# Patient Record
Sex: FEMALE | Race: WHITE | URBAN - METROPOLITAN AREA
[De-identification: names, ages, dates, MRNs, and addresses within clinical notes are randomized per-mention and may not be internally consistent; named-entity substitution may affect disease eponyms.]

---

## 2022-08-15 ENCOUNTER — OFFICE VISIT (OUTPATIENT)
Dept: URBAN - METROPOLITAN AREA CLINIC 128 | Facility: CLINIC | Age: 37
End: 2022-08-15
Payer: COMMERCIAL

## 2022-08-15 ENCOUNTER — WEB ENCOUNTER (OUTPATIENT)
Dept: URBAN - METROPOLITAN AREA CLINIC 128 | Facility: CLINIC | Age: 37
End: 2022-08-15

## 2022-08-15 VITALS
TEMPERATURE: 97.3 F | WEIGHT: 156 LBS | BODY MASS INDEX: 27.64 KG/M2 | DIASTOLIC BLOOD PRESSURE: 78 MMHG | HEIGHT: 63 IN | HEART RATE: 78 BPM | SYSTOLIC BLOOD PRESSURE: 116 MMHG

## 2022-08-15 DIAGNOSIS — R19.7 DIARRHEA, UNSPECIFIED TYPE: ICD-10-CM

## 2022-08-15 DIAGNOSIS — K62.5 RECTAL BLEEDING: ICD-10-CM

## 2022-08-15 PROCEDURE — 99204 OFFICE O/P NEW MOD 45 MIN: CPT | Performed by: PHYSICIAN ASSISTANT

## 2022-08-15 RX ORDER — CHOLESTYRAMINE 4 G/9G
1 PACKET MIXED WITH WATER OR NON-CARBONATED DRINK POWDER, FOR SUSPENSION ORAL TWICE A DAY
Qty: 60 | Refills: 2 | OUTPATIENT
Start: 2022-08-15

## 2022-08-15 NOTE — HPI-OTHER HISTORIES
The patient is a new patient who presents today due to the following symptoms: bright red blood in stool She reports having mucous in the stool and watery stools 2-5 BMs a day Patient denies melena Patient denies any recent antibiotics, travel outside of the [place], sick contacts Duration of symptoms: 05/2022 She denies any blood thinner use Associated symptoms: none What alleviates symptoms: none What aggravates symptoms: none There is no family history of colon polyps or colon cancer.  Last colonoscopy: never The patient denies any family history of colon polyps or colon cancer or IBD

## 2022-08-15 NOTE — PHYSICAL EXAM GASTROINTESTINAL
Abdomen , soft, nontender, nondistended , no guarding or rigidity , no masses palpable , normal bowel sounds , Liver and Spleen , no hepatomegaly present Rectal  , normal sphincter tone, no external or internal hemorrhoids, no rectal masses, no bleeding present. Rectal examination was performed with a chaperone present

## 2022-08-16 ENCOUNTER — LAB OUTSIDE AN ENCOUNTER (OUTPATIENT)
Dept: URBAN - METROPOLITAN AREA CLINIC 128 | Facility: CLINIC | Age: 37
End: 2022-08-16

## 2022-08-16 LAB
A/G RATIO: 1.7
ABSOLUTE BASOPHILS: 19
ABSOLUTE EOSINOPHILS: 19
ABSOLUTE LYMPHOCYTES: 1099
ABSOLUTE MONOCYTES: 379
ABSOLUTE NEUTROPHILS: 3283
ALBUMIN: 4.4
ALKALINE PHOSPHATASE: 54
ALT (SGPT): 12
AST (SGOT): 16
BASOPHILS: 0.4
BILIRUBIN, TOTAL: 0.4
BUN/CREATININE RATIO: (no result)
BUN: 22
CALCIUM: 9.7
CARBON DIOXIDE, TOTAL: 24
CHLORIDE: 103
CREATININE: 0.97
EGFR: 78
EOSINOPHILS: 0.4
FERRITIN, SERUM: 17
GLOBULIN, TOTAL: 2.6
GLUCOSE: 89
HEMATOCRIT: 35.8
HEMOGLOBIN: 12.4
IMMUNOGLOBULIN A: 278
INTERPRETATION: (no result)
IRON BIND.CAP.(TIBC): 392
IRON SATURATION: 13
IRON: 51
LYMPHOCYTES: 22.9
MCH: 28.4
MCHC: 34.6
MCV: 82.1
MONOCYTES: 7.9
MPV: 10.1
NEUTROPHILS: 68.4
PLATELET COUNT: 253
POTASSIUM: 4
PROTEIN, TOTAL: 7
RDW: 12.3
RED BLOOD CELL COUNT: 4.36
SODIUM: 137
TISSUE TRANSGLUTAMINASE AB, IGA: <1
TSH: 2
WHITE BLOOD CELL COUNT: 4.8

## 2022-08-18 ENCOUNTER — CLAIMS CREATED FROM THE CLAIM WINDOW (OUTPATIENT)
Dept: URBAN - METROPOLITAN AREA CLINIC 4 | Facility: CLINIC | Age: 37
End: 2022-08-18
Payer: COMMERCIAL

## 2022-08-18 ENCOUNTER — OFFICE VISIT (OUTPATIENT)
Dept: URBAN - METROPOLITAN AREA SURGERY CENTER 31 | Facility: SURGERY CENTER | Age: 37
End: 2022-08-18
Payer: COMMERCIAL

## 2022-08-18 DIAGNOSIS — K63.89 OTHER SPECIFIED DISEASES OF INTESTINE: ICD-10-CM

## 2022-08-18 DIAGNOSIS — K51.919 ULCERATIVE COLITIS, UNSPECIFIED WITH UNSPECIFIED COMPLICATIONS: ICD-10-CM

## 2022-08-18 DIAGNOSIS — K51.30 CHRONIC ULCERATIVE PROCTOSIGMOIDITIS, WITHOUT COMPLICATIONS: ICD-10-CM

## 2022-08-18 PROCEDURE — 45380 COLONOSCOPY AND BIOPSY: CPT | Performed by: INTERNAL MEDICINE

## 2022-08-18 PROCEDURE — 88305 TISSUE EXAM BY PATHOLOGIST: CPT | Performed by: PATHOLOGY

## 2022-08-18 PROCEDURE — G8907 PT DOC NO EVENTS ON DISCHARG: HCPCS | Performed by: INTERNAL MEDICINE

## 2022-08-19 ENCOUNTER — WEB ENCOUNTER (OUTPATIENT)
Dept: URBAN - METROPOLITAN AREA CLINIC 128 | Facility: CLINIC | Age: 37
End: 2022-08-19

## 2022-08-22 ENCOUNTER — WEB ENCOUNTER (OUTPATIENT)
Dept: URBAN - METROPOLITAN AREA CLINIC 128 | Facility: CLINIC | Age: 37
End: 2022-08-22

## 2022-08-22 ENCOUNTER — TELEPHONE ENCOUNTER (OUTPATIENT)
Dept: URBAN - METROPOLITAN AREA CLINIC 92 | Facility: CLINIC | Age: 37
End: 2022-08-22

## 2022-08-22 RX ORDER — BUDESONIDE 9 MG/1
1 TABLET IN THE MORNING TABLET, EXTENDED RELEASE ORAL ONCE A DAY
Qty: 30 | OUTPATIENT
Start: 2022-08-22 | End: 2022-09-21

## 2022-08-22 RX ORDER — CHOLESTYRAMINE 4 G/9G
1 PACKET MIXED WITH WATER OR NON-CARBONATED DRINK POWDER, FOR SUSPENSION ORAL TWICE A DAY
Qty: 60 | Refills: 2 | Status: ACTIVE | COMMUNITY
Start: 2022-08-15

## 2022-08-22 RX ORDER — BUDESONIDE 9 MG/1
1 TABLET IN THE MORNING TABLET, EXTENDED RELEASE ORAL ONCE A DAY
Qty: 30 | Refills: 0 | OUTPATIENT
Start: 2022-08-30 | End: 2022-09-29

## 2022-08-25 ENCOUNTER — TELEPHONE ENCOUNTER (OUTPATIENT)
Dept: URBAN - METROPOLITAN AREA CLINIC 92 | Facility: CLINIC | Age: 37
End: 2022-08-25

## 2022-08-25 RX ORDER — BUDESONIDE 9 MG/1
1 TABLET IN THE MORNING TABLET, EXTENDED RELEASE ORAL ONCE A DAY
Qty: 30
Start: 2022-08-22 | End: 2022-09-24

## 2022-08-30 ENCOUNTER — TELEPHONE ENCOUNTER (OUTPATIENT)
Dept: URBAN - METROPOLITAN AREA CLINIC 19 | Facility: CLINIC | Age: 37
End: 2022-08-30

## 2022-08-30 RX ORDER — PREDNISONE 20 MG/1
2 TABLETS TABLET ORAL ONCE A DAY
Qty: 28 | Refills: 1 | OUTPATIENT
Start: 2022-09-02 | End: 2022-09-30

## 2022-09-04 LAB
CALPROTECTIN, FECAL: 1560
CAMPYLOBACTER SPP. AG,EIA: (no result)
CLOSTRIDIUM DIFFICILE: (no result)
EIA (1): (no result)
FECAL FAT, QUALITATIVE: (no result)
LEUKOCYTES: (no result)
OVA AND PARASITES, CONC AND PERM SMEAR: (no result)
PANCREATIC ELASTASE, FECAL: 252
SALMONELLA AND SHIGELLA, CULTURE: (no result)
SHIGA TOXINS, EIA W/RFL TO E.COLI O157 CULTURE: (no result)

## 2022-10-05 ENCOUNTER — OFFICE VISIT (OUTPATIENT)
Dept: URBAN - METROPOLITAN AREA CLINIC 128 | Facility: CLINIC | Age: 37
End: 2022-10-05
Payer: COMMERCIAL

## 2022-10-05 VITALS
SYSTOLIC BLOOD PRESSURE: 124 MMHG | DIASTOLIC BLOOD PRESSURE: 76 MMHG | HEART RATE: 78 BPM | TEMPERATURE: 97.9 F | BODY MASS INDEX: 27.46 KG/M2 | HEIGHT: 63 IN | WEIGHT: 155 LBS

## 2022-10-05 DIAGNOSIS — R19.7 DIARRHEA, UNSPECIFIED TYPE: ICD-10-CM

## 2022-10-05 DIAGNOSIS — K51.30 ULCERATIVE RECTOSIGMOIDITIS WITHOUT COMPLICATION: ICD-10-CM

## 2022-10-05 DIAGNOSIS — K62.5 RECTAL BLEEDING: ICD-10-CM

## 2022-10-05 PROCEDURE — 99214 OFFICE O/P EST MOD 30 MIN: CPT | Performed by: PHYSICIAN ASSISTANT

## 2022-10-05 RX ORDER — CHOLESTYRAMINE 4 G/9G
1 PACKET MIXED WITH WATER OR NON-CARBONATED DRINK POWDER, FOR SUSPENSION ORAL TWICE A DAY
Qty: 60 | Refills: 2 | Status: ACTIVE | COMMUNITY
Start: 2022-08-15

## 2022-10-05 RX ORDER — MESALAMINE 1.2 G/1
4 TABLETS WITH A MEAL TABLET, DELAYED RELEASE ORAL ONCE A DAY
Qty: 120 TABLET | Refills: 2 | OUTPATIENT
Start: 2022-10-05 | End: 2023-01-02

## 2022-10-05 RX ORDER — BUDESONIDE 9 MG/1
1 TABLET IN THE MORNING TABLET, EXTENDED RELEASE ORAL ONCE A DAY
Status: ACTIVE | COMMUNITY

## 2022-10-05 NOTE — HPI-OTHER HISTORIES
The patient is here for a follow up visit on her diarrhea and bright red blood per rectum symptoms.  Her 8/22 colonoscopy revealed diffuse chronic and acute colitis consistent with ulcerative colitis with no malignancy or dysplasia noted and a 1 year repeat colonoscopy was recommended. 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 Since starting this treatment she went from 10 BMs a day down to 3 BMs a day but still have rectal bleeding. She denies abdominal pain. Her recent calprotectin level was 1560. Patient denies melena Patient denies any recent antibiotics, travel outside of the [place], sick contacts Duration of symptoms: 05/2022 She denies any blood thinner use Associated symptoms: none What alleviates symptoms: none What aggravates symptoms: none There is no family history of colon polyps or colon cancer.  The patient denies any family history of colon polyps or colon cancer or IBD

## 2022-11-07 ENCOUNTER — TELEPHONE ENCOUNTER (OUTPATIENT)
Dept: URBAN - METROPOLITAN AREA CLINIC 128 | Facility: CLINIC | Age: 37
End: 2022-11-07

## 2022-11-07 RX ORDER — BUDESONIDE 9 MG/1
1 TABLET IN THE MORNING TABLET, EXTENDED RELEASE ORAL ONCE A DAY
Qty: 30 | Refills: 1

## 2022-11-07 RX ORDER — PREDNISONE 10 MG/1
TAKE 4 TABS PO X 1 WEEK, TAKE 3 TABS PO X 1 WEEK, TAKE 2 TABS PO X 1 WEEK, TAKE 1 TAB PO X 1 WEEK TABLET ORAL ONCE A DAY
Qty: 70 | Refills: 0 | OUTPATIENT
Start: 2022-11-09 | End: 2022-12-09

## 2022-11-30 ENCOUNTER — OFFICE VISIT (OUTPATIENT)
Dept: URBAN - METROPOLITAN AREA CLINIC 128 | Facility: CLINIC | Age: 37
End: 2022-11-30
Payer: COMMERCIAL

## 2022-11-30 VITALS
BODY MASS INDEX: 28 KG/M2 | SYSTOLIC BLOOD PRESSURE: 117 MMHG | TEMPERATURE: 97.9 F | HEIGHT: 63 IN | HEART RATE: 76 BPM | DIASTOLIC BLOOD PRESSURE: 71 MMHG | WEIGHT: 158 LBS

## 2022-11-30 DIAGNOSIS — R19.7 DIARRHEA, UNSPECIFIED TYPE: ICD-10-CM

## 2022-11-30 DIAGNOSIS — K51.30 ULCERATIVE RECTOSIGMOIDITIS WITHOUT COMPLICATION: ICD-10-CM

## 2022-11-30 DIAGNOSIS — K62.5 RECTAL BLEEDING: ICD-10-CM

## 2022-11-30 PROCEDURE — 99214 OFFICE O/P EST MOD 30 MIN: CPT | Performed by: PHYSICIAN ASSISTANT

## 2022-11-30 RX ORDER — MESALAMINE 1.2 G/1
4 TABLETS WITH A MEAL TABLET, DELAYED RELEASE ORAL ONCE A DAY
Qty: 120 TABLET | Refills: 2 | Status: ACTIVE | COMMUNITY
Start: 2022-10-05 | End: 2023-01-02

## 2022-11-30 RX ORDER — CHOLESTYRAMINE 4 G/9G
1 PACKET MIXED WITH WATER OR NON-CARBONATED DRINK POWDER, FOR SUSPENSION ORAL TWICE A DAY
Qty: 60 | Refills: 2 | Status: ON HOLD | COMMUNITY
Start: 2022-08-15

## 2022-11-30 RX ORDER — BUDESONIDE 9 MG/1
1 TABLET IN THE MORNING TABLET, EXTENDED RELEASE ORAL ONCE A DAY
Qty: 30 | Refills: 1 | Status: ACTIVE | COMMUNITY

## 2022-11-30 RX ORDER — PREDNISONE 10 MG/1
TAKE 4 TABS PO X 1 WEEK, TAKE 3 TABS PO X 1 WEEK, TAKE 2 TABS PO X 1 WEEK, TAKE 1 TAB PO X 1 WEEK TABLET ORAL ONCE A DAY
Qty: 70 | Refills: 0 | Status: ON HOLD | COMMUNITY
Start: 2022-11-09 | End: 2022-12-09

## 2022-11-30 RX ORDER — MESALAMINE 1.2 G/1
4 TABLETS WITH A MEAL TABLET, DELAYED RELEASE ORAL ONCE A DAY
Qty: 120 TABLET | Refills: 2 | OUTPATIENT

## 2022-11-30 RX ORDER — BUDESONIDE 9 MG/1
1 TABLET IN THE MORNING TABLET, EXTENDED RELEASE ORAL ONCE A DAY
Qty: 30 | Refills: 1 | OUTPATIENT
Start: 2022-11-30 | End: 2023-01-29

## 2022-11-30 NOTE — HPI-OTHER HISTORIES
The patient is here for a follow up visit on her diarrhea and bright red blood per rectum symptoms. She has improved. She is now having 2 BMs a day. She is on lialda and is finishing the uceris currently. She has no blood or mucus in stool. Her 8/22 colonoscopy revealed diffuse chronic and acute colitis consistent with ulcerative colitis with no malignancy or dysplasia noted and a 1 year repeat colonoscopy was recommended. 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 Since starting this treatment she went from 10 BMs a day down to 2 BMs a day but still have rectal bleeding. She denies abdominal pain. Her recent calprotectin level was 1560. Patient denies melena Patient denies any recent antibiotics, travel outside of the [place], sick contacts Duration of symptoms: 05/2022 She denies any blood thinner use Associated symptoms: none What alleviates symptoms: none What aggravates symptoms: none There is no family history of colon polyps or colon cancer.  The patient denies any family history of colon polyps or colon cancer or IBD

## 2023-01-11 ENCOUNTER — OFFICE VISIT (OUTPATIENT)
Dept: URBAN - METROPOLITAN AREA CLINIC 128 | Facility: CLINIC | Age: 38
End: 2023-01-11
Payer: COMMERCIAL

## 2023-01-11 ENCOUNTER — WEB ENCOUNTER (OUTPATIENT)
Dept: URBAN - METROPOLITAN AREA CLINIC 128 | Facility: CLINIC | Age: 38
End: 2023-01-11

## 2023-01-11 VITALS
HEART RATE: 74 BPM | BODY MASS INDEX: 28.17 KG/M2 | DIASTOLIC BLOOD PRESSURE: 81 MMHG | HEIGHT: 63 IN | TEMPERATURE: 98.2 F | WEIGHT: 159 LBS | SYSTOLIC BLOOD PRESSURE: 119 MMHG

## 2023-01-11 DIAGNOSIS — K62.5 RECTAL BLEEDING: ICD-10-CM

## 2023-01-11 DIAGNOSIS — R19.7 DIARRHEA, UNSPECIFIED TYPE: ICD-10-CM

## 2023-01-11 DIAGNOSIS — K51.30 ULCERATIVE RECTOSIGMOIDITIS WITHOUT COMPLICATION: ICD-10-CM

## 2023-01-11 PROCEDURE — 99214 OFFICE O/P EST MOD 30 MIN: CPT | Performed by: PHYSICIAN ASSISTANT

## 2023-01-11 RX ORDER — VEDOLIZUMAB 300 MG/5ML
AS DIRECTED INJECTION, POWDER, LYOPHILIZED, FOR SOLUTION INTRAVENOUS
Qty: 300 MILLIGRAMS | Refills: 0 | OUTPATIENT
Start: 2023-01-11 | End: 2023-04-11

## 2023-01-11 RX ORDER — CHOLESTYRAMINE 4 G/9G
1 PACKET MIXED WITH WATER OR NON-CARBONATED DRINK POWDER, FOR SUSPENSION ORAL TWICE A DAY
Qty: 60 | Refills: 2 | Status: ON HOLD | COMMUNITY
Start: 2022-08-15

## 2023-01-11 RX ORDER — BUDESONIDE 9 MG/1
1 TABLET IN THE MORNING TABLET, EXTENDED RELEASE ORAL ONCE A DAY
Qty: 30 | Refills: 1 | Status: ON HOLD | COMMUNITY

## 2023-01-11 RX ORDER — MESALAMINE 1.2 G/1
4 TABLETS WITH A MEAL TABLET, DELAYED RELEASE ORAL ONCE A DAY
Qty: 120 TABLET | Refills: 2 | Status: ACTIVE | COMMUNITY

## 2023-01-11 NOTE — HPI-OTHER HISTORIES
The patient is here for a follow up visit on her diarrhea and bright red blood per rectum symptoms. She had improved on lialda and uceris but when she stops the uceris her symptoms come back. She now back to having 10 BMs a day with bright red blood at times. She denies abdominal pain. Her 8/22 colonoscopy revealed diffuse chronic and acute colitis consistent with ulcerative colitis with no malignancy or dysplasia noted and a 1 year repeat colonoscopy was recommended. 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 Since starting this treatment she went from 10 BMs a day down to 2 BMs a day but still have rectal bleeding. Her 08/2022 calprotectin level was 1560. Patient denies melena Patient denies any recent antibiotics, travel outside of the [place], sick contacts Duration of symptoms: 05/2022 She denies any blood thinner use Associated symptoms: none What alleviates symptoms: none What aggravates symptoms: none There is no family history of colon polyps or colon cancer.  The patient denies any family history of colon polyps or colon cancer or IBD

## 2023-01-12 ENCOUNTER — TELEPHONE ENCOUNTER (OUTPATIENT)
Dept: URBAN - METROPOLITAN AREA CLINIC 18 | Facility: CLINIC | Age: 38
End: 2023-01-12

## 2023-01-12 ENCOUNTER — LAB OUTSIDE AN ENCOUNTER (OUTPATIENT)
Dept: URBAN - METROPOLITAN AREA CLINIC 128 | Facility: CLINIC | Age: 38
End: 2023-01-12

## 2023-01-17 PROBLEM — 41364008: Status: ACTIVE | Noted: 2022-10-05

## 2023-01-18 LAB
A/G RATIO: 1.7
ABSOLUTE BASOPHILS: 22
ABSOLUTE EOSINOPHILS: 22
ABSOLUTE LYMPHOCYTES: 1150
ABSOLUTE MONOCYTES: 421
ABSOLUTE NEUTROPHILS: 3785
ALBUMIN: 4.7
ALKALINE PHOSPHATASE: 65
ALT (SGPT): 13
ANCA SCREEN: (no result)
AST (SGOT): 16
BASOPHILS: 0.4
BILIRUBIN, TOTAL: 0.3
BUN/CREATININE RATIO: (no result)
BUN: 14
CALCIUM: 10
CARBON DIOXIDE, TOTAL: 25
CHLORIDE: 103
CREATININE: 0.89
EGFR: 86
EOSINOPHILS: 0.4
GLOBULIN, TOTAL: 2.7
GLUCOSE: 89
HBSAG SCREEN: (no result)
HEMATOCRIT: 35
HEMOGLOBIN: 11.4
HEP A AB, IGM: (no result)
HEP B CORE AB, IGM: (no result)
HEP C VIRUS AB: 0.04
HEPATITIS C ANTIBODY: (no result)
LYMPHOCYTES: 21.3
MCH: 24.7
MCHC: 32.6
MCV: 75.9
MITOGEN-NIL: >10
MONOCYTES: 7.8
MPV: 10.5
MYELOPEROXIDASE ANTIBODY: <1
NEUTROPHILS: 70.1
P-ANCA TITER: (no result)
PLATELET COUNT: 316
POTASSIUM: 4.3
PROTEIN, TOTAL: 7.4
PROTEINASE-3 ANTIBODY: <1
QUANTIFERON NIL VALUE: 0.07
QUANTIFERON TB1 AG VALUE: <0
QUANTIFERON TB2 AG VALUE: 0
QUANTIFERON-TB GOLD PLUS: NEGATIVE
RDW: 12.4
RED BLOOD CELL COUNT: 4.61
SED RATE BY MODIFIED: 9
SODIUM: 140
WHITE BLOOD CELL COUNT: 5.4

## 2023-01-19 LAB
ANCA SCREEN: (no result)
ATYPICAL P ANCA TITER: (no result)
MYELOPEROXIDASE ANTIBODY: <1
PROTEINASE-3 ANTIBODY: <1
SACCHAROMYCES CEREVISIAE AB (ASCA) (IGA): 7.3
SACCHAROMYCES CEREVISIAE AB (ASCA) (IGG): 13.7

## 2023-02-06 ENCOUNTER — TELEPHONE ENCOUNTER (OUTPATIENT)
Dept: URBAN - METROPOLITAN AREA CLINIC 18 | Facility: CLINIC | Age: 38
End: 2023-02-06

## 2023-02-23 ENCOUNTER — OFFICE VISIT (OUTPATIENT)
Dept: URBAN - METROPOLITAN AREA CLINIC 79 | Facility: CLINIC | Age: 38
End: 2023-02-23

## 2023-02-23 ENCOUNTER — CLAIMS CREATED FROM THE CLAIM WINDOW (OUTPATIENT)
Dept: URBAN - METROPOLITAN AREA CLINIC 79 | Facility: CLINIC | Age: 38
End: 2023-02-23
Payer: COMMERCIAL

## 2023-02-23 ENCOUNTER — CLAIMS CREATED FROM THE CLAIM WINDOW (OUTPATIENT)
Dept: URBAN - METROPOLITAN AREA CLINIC 79 | Facility: CLINIC | Age: 38
End: 2023-02-23

## 2023-02-23 VITALS
DIASTOLIC BLOOD PRESSURE: 80 MMHG | RESPIRATION RATE: 18 BRPM | TEMPERATURE: 97.7 F | SYSTOLIC BLOOD PRESSURE: 127 MMHG | HEART RATE: 83 BPM | WEIGHT: 162 LBS | BODY MASS INDEX: 28.7 KG/M2 | HEIGHT: 63 IN

## 2023-02-23 DIAGNOSIS — K51.80 OTHER ULCERATIVE COLITIS: ICD-10-CM

## 2023-02-23 PROCEDURE — 96413 CHEMO IV INFUSION 1 HR: CPT | Performed by: INTERNAL MEDICINE

## 2023-02-23 RX ORDER — MESALAMINE 1.2 G/1
4 TABLETS WITH A MEAL TABLET, DELAYED RELEASE ORAL ONCE A DAY
Qty: 120 TABLET | Refills: 2 | Status: ACTIVE | COMMUNITY

## 2023-02-23 RX ORDER — BUDESONIDE 9 MG/1
1 TABLET IN THE MORNING TABLET, EXTENDED RELEASE ORAL ONCE A DAY
Qty: 30 | Refills: 1 | Status: ON HOLD | COMMUNITY

## 2023-02-23 RX ORDER — CHOLESTYRAMINE 4 G/9G
1 PACKET MIXED WITH WATER OR NON-CARBONATED DRINK POWDER, FOR SUSPENSION ORAL TWICE A DAY
Qty: 60 | Refills: 2 | Status: ON HOLD | COMMUNITY
Start: 2022-08-15

## 2023-02-23 RX ORDER — VEDOLIZUMAB 300 MG/5ML
AS DIRECTED INJECTION, POWDER, LYOPHILIZED, FOR SOLUTION INTRAVENOUS
Qty: 300 MILLIGRAMS | Refills: 0 | Status: ACTIVE | COMMUNITY
Start: 2023-01-11 | End: 2023-04-11

## 2023-02-24 ENCOUNTER — TELEPHONE ENCOUNTER (OUTPATIENT)
Dept: URBAN - METROPOLITAN AREA CLINIC 128 | Facility: CLINIC | Age: 38
End: 2023-02-24

## 2023-03-03 ENCOUNTER — TELEPHONE ENCOUNTER (OUTPATIENT)
Dept: URBAN - METROPOLITAN AREA CLINIC 128 | Facility: CLINIC | Age: 38
End: 2023-03-03

## 2023-03-06 PROBLEM — 64766004 ULCERATIVE COLITIS: Status: ACTIVE | Noted: 2023-03-06

## 2023-03-09 ENCOUNTER — OFFICE VISIT (OUTPATIENT)
Dept: URBAN - METROPOLITAN AREA CLINIC 79 | Facility: CLINIC | Age: 38
End: 2023-03-09
Payer: COMMERCIAL

## 2023-03-09 VITALS
SYSTOLIC BLOOD PRESSURE: 138 MMHG | BODY MASS INDEX: 28.7 KG/M2 | WEIGHT: 162 LBS | HEART RATE: 76 BPM | HEIGHT: 63 IN | TEMPERATURE: 97.7 F | RESPIRATION RATE: 18 BRPM | DIASTOLIC BLOOD PRESSURE: 76 MMHG

## 2023-03-09 DIAGNOSIS — K51.80 CHRONIC PANCOLONIC ULCERATIVE COLITIS: ICD-10-CM

## 2023-03-09 PROCEDURE — 96413 CHEMO IV INFUSION 1 HR: CPT | Performed by: INTERNAL MEDICINE

## 2023-03-09 RX ORDER — MESALAMINE 1.2 G/1
4 TABLETS WITH A MEAL TABLET, DELAYED RELEASE ORAL ONCE A DAY
Qty: 120 TABLET | Refills: 2 | Status: ACTIVE | COMMUNITY

## 2023-03-09 RX ORDER — BUDESONIDE 9 MG/1
1 TABLET IN THE MORNING TABLET, EXTENDED RELEASE ORAL ONCE A DAY
Qty: 30 | Refills: 1 | Status: ON HOLD | COMMUNITY

## 2023-03-09 RX ORDER — CHOLESTYRAMINE 4 G/9G
1 PACKET MIXED WITH WATER OR NON-CARBONATED DRINK POWDER, FOR SUSPENSION ORAL TWICE A DAY
Qty: 60 | Refills: 2 | Status: ON HOLD | COMMUNITY
Start: 2022-08-15

## 2023-03-09 RX ORDER — VEDOLIZUMAB 300 MG/5ML
AS DIRECTED INJECTION, POWDER, LYOPHILIZED, FOR SOLUTION INTRAVENOUS
Qty: 300 MILLIGRAMS | Refills: 0 | Status: ACTIVE | COMMUNITY
Start: 2023-01-11 | End: 2023-04-11

## 2023-03-15 ENCOUNTER — OFFICE VISIT (OUTPATIENT)
Dept: URBAN - METROPOLITAN AREA CLINIC 128 | Facility: CLINIC | Age: 38
End: 2023-03-15

## 2023-03-23 ENCOUNTER — OFFICE VISIT (OUTPATIENT)
Dept: URBAN - METROPOLITAN AREA SURGERY CENTER 31 | Facility: SURGERY CENTER | Age: 38
End: 2023-03-23

## 2023-03-30 ENCOUNTER — OFFICE VISIT (OUTPATIENT)
Dept: URBAN - METROPOLITAN AREA CLINIC 128 | Facility: CLINIC | Age: 38
End: 2023-03-30
Payer: COMMERCIAL

## 2023-03-30 VITALS
HEIGHT: 63 IN | WEIGHT: 160.6 LBS | HEART RATE: 74 BPM | SYSTOLIC BLOOD PRESSURE: 112 MMHG | DIASTOLIC BLOOD PRESSURE: 68 MMHG | TEMPERATURE: 98.3 F | BODY MASS INDEX: 28.46 KG/M2

## 2023-03-30 DIAGNOSIS — K51.30 ULCERATIVE RECTOSIGMOIDITIS WITHOUT COMPLICATION: ICD-10-CM

## 2023-03-30 DIAGNOSIS — K62.5 RECTAL BLEEDING: ICD-10-CM

## 2023-03-30 DIAGNOSIS — R19.7 DIARRHEA, UNSPECIFIED TYPE: ICD-10-CM

## 2023-03-30 PROCEDURE — 99213 OFFICE O/P EST LOW 20 MIN: CPT | Performed by: PHYSICIAN ASSISTANT

## 2023-03-30 RX ORDER — VEDOLIZUMAB 300 MG/5ML
AS DIRECTED INJECTION, POWDER, LYOPHILIZED, FOR SOLUTION INTRAVENOUS
Qty: 300 MILLIGRAMS | Refills: 0 | Status: ACTIVE | COMMUNITY
Start: 2023-01-11 | End: 2023-04-11

## 2023-03-30 RX ORDER — ETONOGESTREL AND ETHINYL ESTRADIOL .12; .015 MG/D; MG/D
1 RING LEAVE IN PLACE FOR 3 WEEKS, REMOVE, AND REPLACE WITH A NEW RING AFTER 7 DAY BREAK INSERT, EXTENDED RELEASE VAGINAL
Status: ACTIVE | COMMUNITY

## 2023-03-30 RX ORDER — CHOLESTYRAMINE 4 G/9G
1 PACKET MIXED WITH WATER OR NON-CARBONATED DRINK POWDER, FOR SUSPENSION ORAL TWICE A DAY
Qty: 60 | Refills: 2 | Status: ON HOLD | COMMUNITY
Start: 2022-08-15

## 2023-03-30 RX ORDER — BUDESONIDE 9 MG/1
1 TABLET IN THE MORNING TABLET, EXTENDED RELEASE ORAL ONCE A DAY
Qty: 30 | Refills: 1 | Status: ON HOLD | COMMUNITY

## 2023-03-30 RX ORDER — MESALAMINE 1.2 G/1
4 TABLETS WITH A MEAL TABLET, DELAYED RELEASE ORAL ONCE A DAY
Qty: 120 TABLET | Refills: 2 | Status: ON HOLD | COMMUNITY

## 2023-03-30 NOTE — HPI-OTHER HISTORIES
The patient is here for a follow up visit on her ulcerative colitis.  She is now on entyvio and went from 10 BMs a day down to 1 BM a day. She denies diarrhea, rectal bleeding, mucous, abdominal pain. She states 4 days after the entyvio she has noticed some pimples on her buttocks but they go away quickly. She denies hives or urticaria after her infusion.  Her 8/22 colonoscopy revealed diffuse chronic and acute colitis consistent with ulcerative colitis with no malignancy or dysplasia noted and a 1 year repeat colonoscopy was recommended. 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 Since starting this treatment she went from 10 Her 08/2022 calprotectin level was 1560. Her panca test was abnormal. Patient denies melena Patient denies any recent antibiotics, travel outside of the [place], sick contacts Duration of symptoms: 05/2022 She denies any blood thinner use Associated symptoms: none What alleviates symptoms: none What aggravates symptoms: none There is no family history of colon polyps or colon cancer.  The patient denies any family history of colon polyps or colon cancer or IBD

## 2023-04-06 ENCOUNTER — OFFICE VISIT (OUTPATIENT)
Dept: URBAN - METROPOLITAN AREA CLINIC 79 | Facility: CLINIC | Age: 38
End: 2023-04-06
Payer: COMMERCIAL

## 2023-04-06 VITALS
HEIGHT: 63 IN | RESPIRATION RATE: 20 BRPM | BODY MASS INDEX: 28.7 KG/M2 | WEIGHT: 162 LBS | SYSTOLIC BLOOD PRESSURE: 112 MMHG | HEART RATE: 79 BPM | DIASTOLIC BLOOD PRESSURE: 84 MMHG | TEMPERATURE: 97.9 F

## 2023-04-06 DIAGNOSIS — K51.80 OTHER ULCERATIVE COLITIS: ICD-10-CM

## 2023-04-06 PROCEDURE — 96413 CHEMO IV INFUSION 1 HR: CPT | Performed by: INTERNAL MEDICINE

## 2023-04-06 RX ORDER — BUDESONIDE 9 MG/1
1 TABLET IN THE MORNING TABLET, EXTENDED RELEASE ORAL ONCE A DAY
Qty: 30 | Refills: 1 | Status: ON HOLD | COMMUNITY

## 2023-04-06 RX ORDER — VEDOLIZUMAB 300 MG/5ML
AS DIRECTED INJECTION, POWDER, LYOPHILIZED, FOR SOLUTION INTRAVENOUS
Qty: 300 MILLIGRAMS | Refills: 0 | Status: ACTIVE | COMMUNITY
Start: 2023-01-11 | End: 2023-04-11

## 2023-04-06 RX ORDER — CHOLESTYRAMINE 4 G/9G
1 PACKET MIXED WITH WATER OR NON-CARBONATED DRINK POWDER, FOR SUSPENSION ORAL TWICE A DAY
Qty: 60 | Refills: 2 | Status: ON HOLD | COMMUNITY
Start: 2022-08-15

## 2023-04-06 RX ORDER — ETONOGESTREL AND ETHINYL ESTRADIOL .12; .015 MG/D; MG/D
1 RING LEAVE IN PLACE FOR 3 WEEKS, REMOVE, AND REPLACE WITH A NEW RING AFTER 7 DAY BREAK INSERT, EXTENDED RELEASE VAGINAL
Status: ACTIVE | COMMUNITY

## 2023-04-06 RX ORDER — MESALAMINE 1.2 G/1
4 TABLETS WITH A MEAL TABLET, DELAYED RELEASE ORAL ONCE A DAY
Qty: 120 TABLET | Refills: 2 | Status: ON HOLD | COMMUNITY

## 2023-05-25 ENCOUNTER — OFFICE VISIT (OUTPATIENT)
Dept: URBAN - METROPOLITAN AREA MEDICAL CENTER 25 | Facility: MEDICAL CENTER | Age: 38
End: 2023-05-25
Payer: COMMERCIAL

## 2023-05-25 DIAGNOSIS — K52.89 (LYMPHOCYTIC) MICROSCOPIC COLITIS: ICD-10-CM

## 2023-05-25 DIAGNOSIS — K63.89 APPENDICITIS EPIPLOICA: ICD-10-CM

## 2023-05-25 DIAGNOSIS — K51.00 ACUTE ULCERATIVE PANCOLITIS: ICD-10-CM

## 2023-05-25 PROCEDURE — 45380 COLONOSCOPY AND BIOPSY: CPT | Performed by: INTERNAL MEDICINE

## 2023-06-01 ENCOUNTER — OFFICE VISIT (OUTPATIENT)
Dept: URBAN - METROPOLITAN AREA CLINIC 79 | Facility: CLINIC | Age: 38
End: 2023-06-01
Payer: COMMERCIAL

## 2023-06-01 VITALS
SYSTOLIC BLOOD PRESSURE: 110 MMHG | TEMPERATURE: 97.9 F | RESPIRATION RATE: 20 BRPM | WEIGHT: 164 LBS | HEIGHT: 63 IN | DIASTOLIC BLOOD PRESSURE: 82 MMHG | HEART RATE: 69 BPM | BODY MASS INDEX: 29.06 KG/M2

## 2023-06-01 DIAGNOSIS — K51.80 OTHER ULCERATIVE COLITIS: ICD-10-CM

## 2023-06-01 PROCEDURE — 96413 CHEMO IV INFUSION 1 HR: CPT | Performed by: INTERNAL MEDICINE

## 2023-06-01 RX ORDER — MESALAMINE 1.2 G/1
4 TABLETS WITH A MEAL TABLET, DELAYED RELEASE ORAL ONCE A DAY
Qty: 120 TABLET | Refills: 2 | Status: ON HOLD | COMMUNITY

## 2023-06-01 RX ORDER — BUDESONIDE 9 MG/1
1 TABLET IN THE MORNING TABLET, EXTENDED RELEASE ORAL ONCE A DAY
Qty: 30 | Refills: 1 | Status: ON HOLD | COMMUNITY

## 2023-06-01 RX ORDER — CHOLESTYRAMINE 4 G/9G
1 PACKET MIXED WITH WATER OR NON-CARBONATED DRINK POWDER, FOR SUSPENSION ORAL TWICE A DAY
Qty: 60 | Refills: 2 | Status: ON HOLD | COMMUNITY
Start: 2022-08-15

## 2023-06-01 RX ORDER — ETONOGESTREL AND ETHINYL ESTRADIOL .12; .015 MG/D; MG/D
1 RING LEAVE IN PLACE FOR 3 WEEKS, REMOVE, AND REPLACE WITH A NEW RING AFTER 7 DAY BREAK INSERT, EXTENDED RELEASE VAGINAL
Status: ACTIVE | COMMUNITY

## 2023-06-26 ENCOUNTER — DASHBOARD ENCOUNTERS (OUTPATIENT)
Age: 38
End: 2023-06-26

## 2023-06-26 ENCOUNTER — OFFICE VISIT (OUTPATIENT)
Dept: URBAN - METROPOLITAN AREA CLINIC 128 | Facility: CLINIC | Age: 38
End: 2023-06-26
Payer: COMMERCIAL

## 2023-06-26 VITALS
WEIGHT: 150 LBS | BODY MASS INDEX: 26.58 KG/M2 | HEIGHT: 63 IN | HEART RATE: 53 BPM | SYSTOLIC BLOOD PRESSURE: 104 MMHG | TEMPERATURE: 98.2 F | DIASTOLIC BLOOD PRESSURE: 52 MMHG

## 2023-06-26 DIAGNOSIS — K51.30 ULCERATIVE RECTOSIGMOIDITIS WITHOUT COMPLICATION: ICD-10-CM

## 2023-06-26 PROCEDURE — 99213 OFFICE O/P EST LOW 20 MIN: CPT | Performed by: PHYSICIAN ASSISTANT

## 2023-06-26 RX ORDER — VEDOLIZUMAB 300 MG/5ML
AS DIRECTED INJECTION, POWDER, LYOPHILIZED, FOR SOLUTION INTRAVENOUS
Status: ACTIVE | COMMUNITY

## 2023-06-26 RX ORDER — BUDESONIDE 9 MG/1
1 TABLET IN THE MORNING TABLET, EXTENDED RELEASE ORAL ONCE A DAY
Qty: 30 | Refills: 1 | Status: ON HOLD | COMMUNITY

## 2023-06-26 RX ORDER — ETONOGESTREL AND ETHINYL ESTRADIOL .12; .015 MG/D; MG/D
1 RING LEAVE IN PLACE FOR 3 WEEKS, REMOVE, AND REPLACE WITH A NEW RING AFTER 7 DAY BREAK INSERT, EXTENDED RELEASE VAGINAL
Status: ACTIVE | COMMUNITY

## 2023-06-26 RX ORDER — CHOLESTYRAMINE 4 G/9G
1 PACKET MIXED WITH WATER OR NON-CARBONATED DRINK POWDER, FOR SUSPENSION ORAL TWICE A DAY
Qty: 60 | Refills: 2 | Status: ON HOLD | COMMUNITY
Start: 2022-08-15

## 2023-06-26 RX ORDER — MESALAMINE 1.2 G/1
4 TABLETS WITH A MEAL TABLET, DELAYED RELEASE ORAL ONCE A DAY
Qty: 120 TABLET | Refills: 2 | Status: ON HOLD | COMMUNITY

## 2023-06-26 NOTE — HPI-OTHER HISTORIES
The patient is here for a follow up visit on her ulcerative colitis.  She is now on entyvio and went from 10 BMs a day down to 1 BM a day. She denies diarrhea, rectal bleeding, mucous, abdominal pain. She denies hives or urticaria after her infusion.  Her prior 8/22 colonoscopy revealed diffuse chronic and acute colitis consistent with ulcerative colitis with no malignancy or dysplasia noted and a 1 year repeat colonoscopy was recommended. 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 Her 08/2022 calprotectin level was 1560. Her panca test was abnormal. Patient denies melena Patient denies any recent antibiotics, travel outside of the [place], sick contacts Duration of symptoms: 05/2022 She denies any blood thinner use Associated symptoms: none What alleviates symptoms: none What aggravates symptoms: none There is no family history of colon polyps or colon cancer.  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 A repeat colonosocpy in 2 years was advised.

## 2023-07-27 ENCOUNTER — OFFICE VISIT (OUTPATIENT)
Dept: URBAN - METROPOLITAN AREA CLINIC 79 | Facility: CLINIC | Age: 38
End: 2023-07-27
Payer: COMMERCIAL

## 2023-07-27 VITALS
HEIGHT: 63 IN | SYSTOLIC BLOOD PRESSURE: 115 MMHG | RESPIRATION RATE: 20 BRPM | WEIGHT: 141 LBS | HEART RATE: 78 BPM | BODY MASS INDEX: 24.98 KG/M2 | DIASTOLIC BLOOD PRESSURE: 74 MMHG | TEMPERATURE: 97.5 F

## 2023-07-27 DIAGNOSIS — K51.80 CHRONIC PANCOLONIC ULCERATIVE COLITIS: ICD-10-CM

## 2023-07-27 PROCEDURE — 96413 CHEMO IV INFUSION 1 HR: CPT | Performed by: INTERNAL MEDICINE

## 2023-07-27 RX ORDER — ETONOGESTREL AND ETHINYL ESTRADIOL .12; .015 MG/D; MG/D
1 RING LEAVE IN PLACE FOR 3 WEEKS, REMOVE, AND REPLACE WITH A NEW RING AFTER 7 DAY BREAK INSERT, EXTENDED RELEASE VAGINAL
Status: ACTIVE | COMMUNITY

## 2023-07-27 RX ORDER — VEDOLIZUMAB 300 MG/5ML
AS DIRECTED INJECTION, POWDER, LYOPHILIZED, FOR SOLUTION INTRAVENOUS
Status: ACTIVE | COMMUNITY

## 2023-07-27 RX ORDER — CHOLESTYRAMINE 4 G/9G
1 PACKET MIXED WITH WATER OR NON-CARBONATED DRINK POWDER, FOR SUSPENSION ORAL TWICE A DAY
Qty: 60 | Refills: 2 | Status: ON HOLD | COMMUNITY
Start: 2022-08-15

## 2023-07-27 RX ORDER — BUDESONIDE 9 MG/1
1 TABLET IN THE MORNING TABLET, EXTENDED RELEASE ORAL ONCE A DAY
Qty: 30 | Refills: 1 | Status: ON HOLD | COMMUNITY

## 2023-07-27 RX ORDER — MESALAMINE 1.2 G/1
4 TABLETS WITH A MEAL TABLET, DELAYED RELEASE ORAL ONCE A DAY
Qty: 120 TABLET | Refills: 2 | Status: ON HOLD | COMMUNITY

## 2023-09-21 ENCOUNTER — OFFICE VISIT (OUTPATIENT)
Dept: URBAN - METROPOLITAN AREA CLINIC 79 | Facility: CLINIC | Age: 38
End: 2023-09-21
Payer: COMMERCIAL

## 2023-09-21 VITALS
BODY MASS INDEX: 24.8 KG/M2 | HEIGHT: 63 IN | TEMPERATURE: 97.9 F | SYSTOLIC BLOOD PRESSURE: 121 MMHG | WEIGHT: 140 LBS | RESPIRATION RATE: 18 BRPM | HEART RATE: 84 BPM | DIASTOLIC BLOOD PRESSURE: 75 MMHG

## 2023-09-21 DIAGNOSIS — K51.80 OTHER ULCERATIVE COLITIS: ICD-10-CM

## 2023-09-21 PROCEDURE — 96413 CHEMO IV INFUSION 1 HR: CPT | Performed by: INTERNAL MEDICINE

## 2023-09-21 RX ORDER — BUDESONIDE 9 MG/1
1 TABLET IN THE MORNING TABLET, EXTENDED RELEASE ORAL ONCE A DAY
Qty: 30 | Refills: 1 | Status: ON HOLD | COMMUNITY

## 2023-09-21 RX ORDER — VEDOLIZUMAB 300 MG/5ML
AS DIRECTED INJECTION, POWDER, LYOPHILIZED, FOR SOLUTION INTRAVENOUS
Status: ACTIVE | COMMUNITY

## 2023-09-21 RX ORDER — ETONOGESTREL AND ETHINYL ESTRADIOL .12; .015 MG/D; MG/D
1 RING LEAVE IN PLACE FOR 3 WEEKS, REMOVE, AND REPLACE WITH A NEW RING AFTER 7 DAY BREAK INSERT, EXTENDED RELEASE VAGINAL
Status: ACTIVE | COMMUNITY

## 2023-09-21 RX ORDER — CHOLESTYRAMINE 4 G/9G
1 PACKET MIXED WITH WATER OR NON-CARBONATED DRINK POWDER, FOR SUSPENSION ORAL TWICE A DAY
Qty: 60 | Refills: 2 | Status: ON HOLD | COMMUNITY
Start: 2022-08-15

## 2023-09-21 RX ORDER — MESALAMINE 1.2 G/1
4 TABLETS WITH A MEAL TABLET, DELAYED RELEASE ORAL ONCE A DAY
Qty: 120 TABLET | Refills: 2 | Status: ON HOLD | COMMUNITY

## 2023-11-16 ENCOUNTER — OFFICE VISIT (OUTPATIENT)
Dept: URBAN - METROPOLITAN AREA CLINIC 18 | Facility: CLINIC | Age: 38
End: 2023-11-16
Payer: COMMERCIAL

## 2023-11-16 ENCOUNTER — TELEPHONE ENCOUNTER (OUTPATIENT)
Dept: URBAN - METROPOLITAN AREA CLINIC 18 | Facility: CLINIC | Age: 38
End: 2023-11-16

## 2023-11-16 VITALS
SYSTOLIC BLOOD PRESSURE: 108 MMHG | WEIGHT: 130 LBS | HEIGHT: 63 IN | TEMPERATURE: 98.2 F | HEART RATE: 74 BPM | DIASTOLIC BLOOD PRESSURE: 65 MMHG | BODY MASS INDEX: 23.04 KG/M2 | RESPIRATION RATE: 18 BRPM

## 2023-11-16 DIAGNOSIS — K51.80 OTHER ULCERATIVE COLITIS: ICD-10-CM

## 2023-11-16 PROCEDURE — 96413 CHEMO IV INFUSION 1 HR: CPT | Performed by: INTERNAL MEDICINE

## 2023-11-16 RX ORDER — VEDOLIZUMAB 300 MG/5ML
AS DIRECTED INJECTION, POWDER, LYOPHILIZED, FOR SOLUTION INTRAVENOUS
Status: ACTIVE | COMMUNITY

## 2023-11-16 RX ORDER — BUDESONIDE 9 MG/1
1 TABLET IN THE MORNING TABLET, EXTENDED RELEASE ORAL ONCE A DAY
Qty: 30 | Refills: 1 | Status: ON HOLD | COMMUNITY

## 2023-11-16 RX ORDER — MESALAMINE 1.2 G/1
4 TABLETS WITH A MEAL TABLET, DELAYED RELEASE ORAL ONCE A DAY
Qty: 120 TABLET | Refills: 2 | Status: ON HOLD | COMMUNITY

## 2023-11-16 RX ORDER — ETONOGESTREL AND ETHINYL ESTRADIOL .12; .015 MG/D; MG/D
1 RING LEAVE IN PLACE FOR 3 WEEKS, REMOVE, AND REPLACE WITH A NEW RING AFTER 7 DAY BREAK INSERT, EXTENDED RELEASE VAGINAL
Status: ACTIVE | COMMUNITY

## 2023-11-16 RX ORDER — CHOLESTYRAMINE 4 G/9G
1 PACKET MIXED WITH WATER OR NON-CARBONATED DRINK POWDER, FOR SUSPENSION ORAL TWICE A DAY
Qty: 60 | Refills: 2 | Status: ON HOLD | COMMUNITY
Start: 2022-08-15

## 2023-12-21 ENCOUNTER — TELEPHONE ENCOUNTER (OUTPATIENT)
Dept: URBAN - METROPOLITAN AREA CLINIC 18 | Facility: CLINIC | Age: 38
End: 2023-12-21

## 2023-12-29 ENCOUNTER — TELEPHONE ENCOUNTER (OUTPATIENT)
Dept: URBAN - METROPOLITAN AREA CLINIC 18 | Facility: CLINIC | Age: 38
End: 2023-12-29

## 2023-12-29 ENCOUNTER — OFFICE VISIT (OUTPATIENT)
Dept: URBAN - METROPOLITAN AREA CLINIC 79 | Facility: CLINIC | Age: 38
End: 2023-12-29
Payer: COMMERCIAL

## 2023-12-29 VITALS
DIASTOLIC BLOOD PRESSURE: 70 MMHG | HEART RATE: 76 BPM | WEIGHT: 127 LBS | RESPIRATION RATE: 18 BRPM | SYSTOLIC BLOOD PRESSURE: 100 MMHG | HEIGHT: 63 IN | TEMPERATURE: 98.4 F | BODY MASS INDEX: 22.5 KG/M2

## 2023-12-29 DIAGNOSIS — K51.80 OTHER ULCERATIVE COLITIS: ICD-10-CM

## 2023-12-29 PROBLEM — 64766004: Status: ACTIVE | Noted: 2023-12-29

## 2023-12-29 PROCEDURE — 96413 CHEMO IV INFUSION 1 HR: CPT | Performed by: INTERNAL MEDICINE

## 2023-12-29 RX ORDER — BUDESONIDE 9 MG/1
1 TABLET IN THE MORNING TABLET, EXTENDED RELEASE ORAL ONCE A DAY
Qty: 30 | Refills: 1 | Status: ON HOLD | COMMUNITY

## 2023-12-29 RX ORDER — VEDOLIZUMAB 300 MG/5ML: INFUSE 300MG OVER 30 MINUTES INJECTION, POWDER, LYOPHILIZED, FOR SOLUTION INTRAVENOUS

## 2023-12-29 RX ORDER — MESALAMINE 1.2 G/1
4 TABLETS WITH A MEAL TABLET, DELAYED RELEASE ORAL ONCE A DAY
Qty: 120 TABLET | Refills: 2 | Status: ON HOLD | COMMUNITY

## 2023-12-29 RX ORDER — VEDOLIZUMAB 300 MG/5ML
AS DIRECTED INJECTION, POWDER, LYOPHILIZED, FOR SOLUTION INTRAVENOUS
Status: ACTIVE | COMMUNITY

## 2023-12-29 RX ORDER — ETONOGESTREL AND ETHINYL ESTRADIOL .12; .015 MG/D; MG/D
1 RING LEAVE IN PLACE FOR 3 WEEKS, REMOVE, AND REPLACE WITH A NEW RING AFTER 7 DAY BREAK INSERT, EXTENDED RELEASE VAGINAL
Status: ACTIVE | COMMUNITY

## 2023-12-29 RX ORDER — CHOLESTYRAMINE 4 G/9G
1 PACKET MIXED WITH WATER OR NON-CARBONATED DRINK POWDER, FOR SUSPENSION ORAL TWICE A DAY
Qty: 60 | Refills: 2 | Status: ON HOLD | COMMUNITY
Start: 2022-08-15

## 2024-01-08 ENCOUNTER — TELEPHONE ENCOUNTER (OUTPATIENT)
Dept: URBAN - METROPOLITAN AREA CLINIC 128 | Facility: CLINIC | Age: 39
End: 2024-01-08

## 2024-01-19 ENCOUNTER — TELEPHONE ENCOUNTER (OUTPATIENT)
Dept: URBAN - METROPOLITAN AREA CLINIC 19 | Facility: CLINIC | Age: 39
End: 2024-01-19

## 2024-02-12 LAB
MITOGEN-NIL: 9.35
QUANTIFERON NIL VALUE: 0.04
QUANTIFERON TB1 AG VALUE: <0
QUANTIFERON TB2 AG VALUE: <0
QUANTIFERON-TB GOLD PLUS: NEGATIVE

## 2024-02-22 ENCOUNTER — ENT (OUTPATIENT)
Dept: URBAN - METROPOLITAN AREA CLINIC 79 | Facility: CLINIC | Age: 39
End: 2024-02-22
Payer: COMMERCIAL

## 2024-02-22 VITALS
HEART RATE: 96 BPM | HEIGHT: 63 IN | DIASTOLIC BLOOD PRESSURE: 75 MMHG | TEMPERATURE: 97.5 F | BODY MASS INDEX: 22.86 KG/M2 | RESPIRATION RATE: 18 BRPM | SYSTOLIC BLOOD PRESSURE: 123 MMHG | WEIGHT: 129 LBS

## 2024-02-22 DIAGNOSIS — K51.80 OTHER ULCERATIVE COLITIS: ICD-10-CM

## 2024-02-22 PROCEDURE — 96413 CHEMO IV INFUSION 1 HR: CPT | Performed by: STUDENT IN AN ORGANIZED HEALTH CARE EDUCATION/TRAINING PROGRAM

## 2024-02-22 RX ORDER — MESALAMINE 1.2 G/1
4 TABLETS WITH A MEAL TABLET, DELAYED RELEASE ORAL ONCE A DAY
Qty: 120 TABLET | Refills: 2 | Status: ON HOLD | COMMUNITY

## 2024-02-22 RX ORDER — ETONOGESTREL AND ETHINYL ESTRADIOL .12; .015 MG/D; MG/D
1 RING LEAVE IN PLACE FOR 3 WEEKS, REMOVE, AND REPLACE WITH A NEW RING AFTER 7 DAY BREAK INSERT, EXTENDED RELEASE VAGINAL
Status: ACTIVE | COMMUNITY

## 2024-02-22 RX ORDER — CHOLESTYRAMINE 4 G/9G
1 PACKET MIXED WITH WATER OR NON-CARBONATED DRINK POWDER, FOR SUSPENSION ORAL TWICE A DAY
Qty: 60 | Refills: 2 | Status: ON HOLD | COMMUNITY
Start: 2022-08-15

## 2024-02-22 RX ORDER — BUDESONIDE 9 MG/1
1 TABLET IN THE MORNING TABLET, EXTENDED RELEASE ORAL ONCE A DAY
Qty: 30 | Refills: 1 | Status: ON HOLD | COMMUNITY

## 2024-02-22 RX ORDER — VEDOLIZUMAB 300 MG/5ML
INFUSE 300MG OVER 30 MINUTES INJECTION, POWDER, LYOPHILIZED, FOR SOLUTION INTRAVENOUS
Status: ACTIVE | COMMUNITY

## 2024-04-18 ENCOUNTER — ENT (OUTPATIENT)
Dept: URBAN - METROPOLITAN AREA CLINIC 79 | Facility: CLINIC | Age: 39
End: 2024-04-18
Payer: COMMERCIAL

## 2024-04-18 VITALS
BODY MASS INDEX: 22.57 KG/M2 | HEIGHT: 63 IN | SYSTOLIC BLOOD PRESSURE: 114 MMHG | DIASTOLIC BLOOD PRESSURE: 76 MMHG | HEART RATE: 80 BPM | TEMPERATURE: 98.1 F | RESPIRATION RATE: 18 BRPM | WEIGHT: 127.4 LBS

## 2024-04-18 DIAGNOSIS — K51.80 CHRONIC PANCOLONIC ULCERATIVE COLITIS: ICD-10-CM

## 2024-04-18 PROCEDURE — 96413 CHEMO IV INFUSION 1 HR: CPT | Performed by: STUDENT IN AN ORGANIZED HEALTH CARE EDUCATION/TRAINING PROGRAM

## 2024-04-18 RX ORDER — BUDESONIDE 9 MG/1
1 TABLET IN THE MORNING TABLET, EXTENDED RELEASE ORAL ONCE A DAY
Qty: 30 | Refills: 1 | Status: ON HOLD | COMMUNITY

## 2024-04-18 RX ORDER — CHOLESTYRAMINE 4 G/9G
1 PACKET MIXED WITH WATER OR NON-CARBONATED DRINK POWDER, FOR SUSPENSION ORAL TWICE A DAY
Qty: 60 | Refills: 2 | Status: ON HOLD | COMMUNITY
Start: 2022-08-15

## 2024-04-18 RX ORDER — ETONOGESTREL AND ETHINYL ESTRADIOL .12; .015 MG/D; MG/D
1 RING LEAVE IN PLACE FOR 3 WEEKS, REMOVE, AND REPLACE WITH A NEW RING AFTER 7 DAY BREAK INSERT, EXTENDED RELEASE VAGINAL
Status: ACTIVE | COMMUNITY

## 2024-04-18 RX ORDER — VEDOLIZUMAB 300 MG/5ML
INFUSE 300MG OVER 30 MINUTES INJECTION, POWDER, LYOPHILIZED, FOR SOLUTION INTRAVENOUS
Status: ACTIVE | COMMUNITY

## 2024-04-18 RX ORDER — MESALAMINE 1.2 G/1
4 TABLETS WITH A MEAL TABLET, DELAYED RELEASE ORAL ONCE A DAY
Qty: 120 TABLET | Refills: 2 | Status: ON HOLD | COMMUNITY

## 2024-06-13 ENCOUNTER — OFFICE VISIT (OUTPATIENT)
Dept: URBAN - METROPOLITAN AREA CLINIC 18 | Facility: CLINIC | Age: 39
End: 2024-06-13
Payer: COMMERCIAL

## 2024-06-13 ENCOUNTER — OFFICE VISIT (OUTPATIENT)
Dept: URBAN - METROPOLITAN AREA CLINIC 79 | Facility: CLINIC | Age: 39
End: 2024-06-13

## 2024-06-13 ENCOUNTER — TELEPHONE ENCOUNTER (OUTPATIENT)
Dept: URBAN - METROPOLITAN AREA CLINIC 18 | Facility: CLINIC | Age: 39
End: 2024-06-13

## 2024-06-13 VITALS
DIASTOLIC BLOOD PRESSURE: 66 MMHG | HEIGHT: 63 IN | SYSTOLIC BLOOD PRESSURE: 120 MMHG | BODY MASS INDEX: 23.04 KG/M2 | RESPIRATION RATE: 18 BRPM | TEMPERATURE: 98.4 F | HEART RATE: 81 BPM | WEIGHT: 130 LBS

## 2024-06-13 DIAGNOSIS — K51.80 CHRONIC PANCOLONIC ULCERATIVE COLITIS: ICD-10-CM

## 2024-06-13 PROCEDURE — 96413 CHEMO IV INFUSION 1 HR: CPT | Performed by: STUDENT IN AN ORGANIZED HEALTH CARE EDUCATION/TRAINING PROGRAM

## 2024-06-13 RX ORDER — BUDESONIDE 9 MG/1
1 TABLET IN THE MORNING TABLET, EXTENDED RELEASE ORAL ONCE A DAY
Qty: 30 | Refills: 1 | Status: ON HOLD | COMMUNITY

## 2024-06-13 RX ORDER — ETONOGESTREL AND ETHINYL ESTRADIOL .12; .015 MG/D; MG/D
1 RING LEAVE IN PLACE FOR 3 WEEKS, REMOVE, AND REPLACE WITH A NEW RING AFTER 7 DAY BREAK INSERT, EXTENDED RELEASE VAGINAL
Status: ACTIVE | COMMUNITY

## 2024-06-13 RX ORDER — CHOLESTYRAMINE 4 G/9G
1 PACKET MIXED WITH WATER OR NON-CARBONATED DRINK POWDER, FOR SUSPENSION ORAL TWICE A DAY
Qty: 60 | Refills: 2 | Status: ON HOLD | COMMUNITY
Start: 2022-08-15

## 2024-06-13 RX ORDER — MESALAMINE 1.2 G/1
4 TABLETS WITH A MEAL TABLET, DELAYED RELEASE ORAL ONCE A DAY
Qty: 120 TABLET | Refills: 2 | Status: ON HOLD | COMMUNITY

## 2024-06-13 RX ORDER — VEDOLIZUMAB 300 MG/5ML
INFUSE 300MG OVER 30 MINUTES INJECTION, POWDER, LYOPHILIZED, FOR SOLUTION INTRAVENOUS
Status: ACTIVE | COMMUNITY

## 2024-06-28 ENCOUNTER — OFFICE VISIT (OUTPATIENT)
Dept: URBAN - METROPOLITAN AREA CLINIC 128 | Facility: CLINIC | Age: 39
End: 2024-06-28
Payer: COMMERCIAL

## 2024-06-28 ENCOUNTER — LAB OUTSIDE AN ENCOUNTER (OUTPATIENT)
Dept: URBAN - METROPOLITAN AREA CLINIC 128 | Facility: CLINIC | Age: 39
End: 2024-06-28

## 2024-06-28 VITALS
BODY MASS INDEX: 23.28 KG/M2 | HEIGHT: 63 IN | WEIGHT: 131.4 LBS | DIASTOLIC BLOOD PRESSURE: 64 MMHG | HEART RATE: 56 BPM | SYSTOLIC BLOOD PRESSURE: 108 MMHG | TEMPERATURE: 97 F

## 2024-06-28 DIAGNOSIS — R10.32 LEFT LOWER QUADRANT ABDOMINAL PAIN: ICD-10-CM

## 2024-06-28 DIAGNOSIS — K51.30 ULCERATIVE RECTOSIGMOIDITIS WITHOUT COMPLICATION: ICD-10-CM

## 2024-06-28 PROCEDURE — 99214 OFFICE O/P EST MOD 30 MIN: CPT | Performed by: PHYSICIAN ASSISTANT

## 2024-06-28 RX ORDER — MESALAMINE 1.2 G/1
4 TABLETS WITH A MEAL TABLET, DELAYED RELEASE ORAL ONCE A DAY
Qty: 120 TABLET | Refills: 2 | Status: ON HOLD | COMMUNITY

## 2024-06-28 RX ORDER — BUDESONIDE 9 MG/1
1 TABLET IN THE MORNING TABLET, EXTENDED RELEASE ORAL ONCE A DAY
Qty: 30 | Refills: 1 | Status: ON HOLD | COMMUNITY

## 2024-06-28 RX ORDER — CHOLESTYRAMINE 4 G/9G
1 PACKET MIXED WITH WATER OR NON-CARBONATED DRINK POWDER, FOR SUSPENSION ORAL TWICE A DAY
Qty: 60 | Refills: 2 | Status: ON HOLD | COMMUNITY
Start: 2022-08-15

## 2024-06-28 RX ORDER — BUDESONIDE 3 MG/1
2 CAPSULES CAPSULE ORAL ONCE A DAY
Qty: 60 | Refills: 0 | OUTPATIENT
Start: 2024-06-28

## 2024-06-28 RX ORDER — VEDOLIZUMAB 300 MG/5ML
INFUSE 300MG OVER 30 MINUTES INJECTION, POWDER, LYOPHILIZED, FOR SOLUTION INTRAVENOUS
Status: ACTIVE | COMMUNITY

## 2024-06-28 RX ORDER — ETONOGESTREL AND ETHINYL ESTRADIOL .12; .015 MG/D; MG/D
1 RING LEAVE IN PLACE FOR 3 WEEKS, REMOVE, AND REPLACE WITH A NEW RING AFTER 7 DAY BREAK INSERT, EXTENDED RELEASE VAGINAL
Status: ACTIVE | COMMUNITY

## 2024-06-28 NOTE — HPI-OTHER HISTORIES
The patient is here for a follow up visit on her ulcerative colitis.  She is now on entyvio and went from 10 BMs a day down to 1 BM a day. She denies diarrhea, rectal bleeding, mucous. She elicits having LLQ abdominal pain and left lower back pain. She does work out but denies trauma to her back. She denies hives or urticaria after her infusion.  Her prior 8/22 colonoscopy revealed diffuse chronic and acute colitis consistent with ulcerative colitis with no malignancy or dysplasia noted and a 1 year repeat colonoscopy was recommended. 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 Her 08/2022 calprotectin level was 1560. Her panca test was abnormal. Patient denies melena Patient denies any recent antibiotics, travel outside of the [place], sick contacts Duration of symptoms: 05/2022 She denies any blood thinner use Associated symptoms: none What alleviates symptoms: none What aggravates symptoms: none There is no family history of colon polyps or colon cancer.  The patient denies any family history of colon polyps or colon cancer or IBD Her colonoscopy in 05/2023 revealed unremarkable small intestine mucosa with benign lymphoid follicle and architectural irregularity, paneth cell metaplasia with focal active inflammation consistent with acive chronic colitis. A repeat colonoscopy in 2 years was advised.

## 2024-06-28 NOTE — PHYSICAL EXAM GASTROINTESTINAL
Abdomen , soft, tenderness to palpation was noted in the LLQ, nondistended , no guarding or rigidity , no masses palpable , normal bowel sounds, negative Carson's sign, negative Rovsing's sign, negative psoas and obturators signs, negative CVA tenderness bilaterally Liver and Spleen , no hepatosplenomegaly Rectal deferred

## 2024-07-01 ENCOUNTER — LAB OUTSIDE AN ENCOUNTER (OUTPATIENT)
Dept: URBAN - METROPOLITAN AREA CLINIC 80 | Facility: CLINIC | Age: 39
End: 2024-07-01

## 2024-07-01 ENCOUNTER — OFFICE VISIT (OUTPATIENT)
Dept: URBAN - METROPOLITAN AREA CLINIC 128 | Facility: CLINIC | Age: 39
End: 2024-07-01

## 2024-07-01 RX ORDER — BUDESONIDE 9 MG/1
1 TABLET IN THE MORNING TABLET, EXTENDED RELEASE ORAL ONCE A DAY
Qty: 30 | Refills: 1 | Status: ON HOLD | COMMUNITY

## 2024-07-01 RX ORDER — BUDESONIDE 3 MG/1
2 CAPSULES CAPSULE ORAL ONCE A DAY
Qty: 60 | Refills: 0 | OUTPATIENT

## 2024-07-01 RX ORDER — BUDESONIDE 3 MG/1
2 CAPSULES CAPSULE ORAL ONCE A DAY
Qty: 60 | Refills: 0 | Status: ACTIVE | COMMUNITY
Start: 2024-06-28

## 2024-07-01 RX ORDER — CHOLESTYRAMINE 4 G/9G
1 PACKET MIXED WITH WATER OR NON-CARBONATED DRINK POWDER, FOR SUSPENSION ORAL TWICE A DAY
Qty: 60 | Refills: 2 | Status: ON HOLD | COMMUNITY
Start: 2022-08-15

## 2024-07-01 RX ORDER — VEDOLIZUMAB 300 MG/5ML
INFUSE 300MG OVER 30 MINUTES INJECTION, POWDER, LYOPHILIZED, FOR SOLUTION INTRAVENOUS
Status: ACTIVE | COMMUNITY

## 2024-07-01 RX ORDER — ETONOGESTREL AND ETHINYL ESTRADIOL .12; .015 MG/D; MG/D
1 RING LEAVE IN PLACE FOR 3 WEEKS, REMOVE, AND REPLACE WITH A NEW RING AFTER 7 DAY BREAK INSERT, EXTENDED RELEASE VAGINAL
Status: ACTIVE | COMMUNITY

## 2024-07-01 RX ORDER — MESALAMINE 1.2 G/1
4 TABLETS WITH A MEAL TABLET, DELAYED RELEASE ORAL ONCE A DAY
Qty: 120 TABLET | Refills: 2 | Status: ON HOLD | COMMUNITY

## 2024-07-02 ENCOUNTER — TELEPHONE ENCOUNTER (OUTPATIENT)
Dept: URBAN - METROPOLITAN AREA CLINIC 128 | Facility: CLINIC | Age: 39
End: 2024-07-02

## 2024-07-12 ENCOUNTER — TELEPHONE ENCOUNTER (OUTPATIENT)
Dept: URBAN - METROPOLITAN AREA CLINIC 128 | Facility: CLINIC | Age: 39
End: 2024-07-12

## 2024-08-02 ENCOUNTER — OFFICE VISIT (OUTPATIENT)
Dept: URBAN - METROPOLITAN AREA CLINIC 128 | Facility: CLINIC | Age: 39
End: 2024-08-02

## 2024-08-05 ENCOUNTER — TELEPHONE ENCOUNTER (OUTPATIENT)
Dept: URBAN - METROPOLITAN AREA CLINIC 18 | Facility: CLINIC | Age: 39
End: 2024-08-05

## 2024-08-05 ENCOUNTER — OFFICE VISIT (OUTPATIENT)
Dept: URBAN - METROPOLITAN AREA CLINIC 18 | Facility: CLINIC | Age: 39
End: 2024-08-05
Payer: COMMERCIAL

## 2024-08-05 VITALS
DIASTOLIC BLOOD PRESSURE: 67 MMHG | SYSTOLIC BLOOD PRESSURE: 116 MMHG | RESPIRATION RATE: 18 BRPM | TEMPERATURE: 98.1 F | HEIGHT: 63 IN | BODY MASS INDEX: 22.86 KG/M2 | HEART RATE: 80 BPM | WEIGHT: 129 LBS

## 2024-08-05 DIAGNOSIS — K51.80 CHRONIC PANCOLONIC ULCERATIVE COLITIS: ICD-10-CM

## 2024-08-05 PROCEDURE — 96413 CHEMO IV INFUSION 1 HR: CPT | Performed by: INTERNAL MEDICINE

## 2024-08-05 RX ORDER — BUDESONIDE 3 MG/1
2 CAPSULES CAPSULE ORAL ONCE A DAY
Qty: 60 | Refills: 0 | Status: ACTIVE | COMMUNITY

## 2024-08-05 RX ORDER — MESALAMINE 1.2 G/1
4 TABLETS WITH A MEAL TABLET, DELAYED RELEASE ORAL ONCE A DAY
Qty: 120 TABLET | Refills: 2 | Status: ON HOLD | COMMUNITY

## 2024-08-05 RX ORDER — ETONOGESTREL AND ETHINYL ESTRADIOL .12; .015 MG/D; MG/D
1 RING LEAVE IN PLACE FOR 3 WEEKS, REMOVE, AND REPLACE WITH A NEW RING AFTER 7 DAY BREAK INSERT, EXTENDED RELEASE VAGINAL
Status: ACTIVE | COMMUNITY

## 2024-08-05 RX ORDER — BUDESONIDE 9 MG/1
1 TABLET IN THE MORNING TABLET, EXTENDED RELEASE ORAL ONCE A DAY
Qty: 30 | Refills: 1 | Status: ON HOLD | COMMUNITY

## 2024-08-05 RX ORDER — CHOLESTYRAMINE 4 G/9G
1 PACKET MIXED WITH WATER OR NON-CARBONATED DRINK POWDER, FOR SUSPENSION ORAL TWICE A DAY
Qty: 60 | Refills: 2 | Status: ON HOLD | COMMUNITY
Start: 2022-08-15

## 2024-08-05 RX ORDER — VEDOLIZUMAB 300 MG/5ML
INFUSE 300MG OVER 30 MINUTES INJECTION, POWDER, LYOPHILIZED, FOR SOLUTION INTRAVENOUS
Status: ACTIVE | COMMUNITY

## 2024-08-21 ENCOUNTER — OFFICE VISIT (OUTPATIENT)
Dept: URBAN - METROPOLITAN AREA CLINIC 128 | Facility: CLINIC | Age: 39
End: 2024-08-21

## 2024-09-04 ENCOUNTER — OFFICE VISIT (OUTPATIENT)
Dept: URBAN - METROPOLITAN AREA CLINIC 128 | Facility: CLINIC | Age: 39
End: 2024-09-04

## 2024-09-23 ENCOUNTER — OFFICE VISIT (OUTPATIENT)
Dept: URBAN - METROPOLITAN AREA CLINIC 128 | Facility: CLINIC | Age: 39
End: 2024-09-23

## 2024-09-23 RX ORDER — ETONOGESTREL AND ETHINYL ESTRADIOL .12; .015 MG/D; MG/D
1 RING LEAVE IN PLACE FOR 3 WEEKS, REMOVE, AND REPLACE WITH A NEW RING AFTER 7 DAY BREAK INSERT, EXTENDED RELEASE VAGINAL
COMMUNITY

## 2024-09-23 RX ORDER — VEDOLIZUMAB 300 MG/5ML
INFUSE 300MG OVER 30 MINUTES INJECTION, POWDER, LYOPHILIZED, FOR SOLUTION INTRAVENOUS
COMMUNITY

## 2024-09-23 RX ORDER — BUDESONIDE 3 MG/1
2 CAPSULES CAPSULE ORAL ONCE A DAY
Qty: 60 | Refills: 0 | COMMUNITY

## 2024-09-23 RX ORDER — BUDESONIDE 3 MG/1
2 CAPSULES CAPSULE ORAL ONCE A DAY
Qty: 60 | Refills: 0 | OUTPATIENT

## 2024-09-23 RX ORDER — BUDESONIDE 9 MG/1
1 TABLET IN THE MORNING TABLET, EXTENDED RELEASE ORAL ONCE A DAY
Qty: 30 | Refills: 1 | COMMUNITY

## 2024-09-23 RX ORDER — MESALAMINE 1.2 G/1
4 TABLETS WITH A MEAL TABLET, DELAYED RELEASE ORAL ONCE A DAY
Qty: 120 TABLET | Refills: 2 | COMMUNITY

## 2024-09-23 RX ORDER — CHOLESTYRAMINE 4 G/9G
1 PACKET MIXED WITH WATER OR NON-CARBONATED DRINK POWDER, FOR SUSPENSION ORAL TWICE A DAY
Qty: 60 | Refills: 2 | COMMUNITY
Start: 2022-08-15

## 2024-09-30 ENCOUNTER — OFFICE VISIT (OUTPATIENT)
Dept: URBAN - METROPOLITAN AREA CLINIC 18 | Facility: CLINIC | Age: 39
End: 2024-09-30

## 2024-09-30 VITALS
WEIGHT: 127 LBS | HEIGHT: 63 IN | BODY MASS INDEX: 22.5 KG/M2 | DIASTOLIC BLOOD PRESSURE: 67 MMHG | HEART RATE: 78 BPM | RESPIRATION RATE: 18 BRPM | TEMPERATURE: 98.2 F | SYSTOLIC BLOOD PRESSURE: 100 MMHG

## 2024-09-30 RX ORDER — VEDOLIZUMAB 300 MG/5ML
INFUSE 300MG OVER 30 MINUTES INJECTION, POWDER, LYOPHILIZED, FOR SOLUTION INTRAVENOUS
COMMUNITY

## 2024-09-30 RX ORDER — BUDESONIDE 3 MG/1
2 CAPSULES CAPSULE ORAL ONCE A DAY
Qty: 60 | Refills: 0 | Status: ACTIVE | COMMUNITY

## 2024-09-30 RX ORDER — BUDESONIDE 9 MG/1
1 TABLET IN THE MORNING TABLET, EXTENDED RELEASE ORAL ONCE A DAY
Qty: 30 | Refills: 1 | COMMUNITY

## 2024-09-30 RX ORDER — ETONOGESTREL AND ETHINYL ESTRADIOL .12; .015 MG/D; MG/D
1 RING LEAVE IN PLACE FOR 3 WEEKS, REMOVE, AND REPLACE WITH A NEW RING AFTER 7 DAY BREAK INSERT, EXTENDED RELEASE VAGINAL
COMMUNITY

## 2024-09-30 RX ORDER — CHOLESTYRAMINE 4 G/9G
1 PACKET MIXED WITH WATER OR NON-CARBONATED DRINK POWDER, FOR SUSPENSION ORAL TWICE A DAY
Qty: 60 | Refills: 2 | COMMUNITY
Start: 2022-08-15

## 2024-09-30 RX ORDER — MESALAMINE 1.2 G/1
4 TABLETS WITH A MEAL TABLET, DELAYED RELEASE ORAL ONCE A DAY
Qty: 120 TABLET | Refills: 2 | COMMUNITY

## 2024-10-09 ENCOUNTER — OFFICE VISIT (OUTPATIENT)
Dept: URBAN - METROPOLITAN AREA CLINIC 128 | Facility: CLINIC | Age: 39
End: 2024-10-09

## 2024-10-09 RX ORDER — ETONOGESTREL AND ETHINYL ESTRADIOL .12; .015 MG/D; MG/D
1 RING LEAVE IN PLACE FOR 3 WEEKS, REMOVE, AND REPLACE WITH A NEW RING AFTER 7 DAY BREAK INSERT, EXTENDED RELEASE VAGINAL
COMMUNITY

## 2024-10-09 RX ORDER — BUDESONIDE 3 MG/1
2 CAPSULES CAPSULE ORAL ONCE A DAY
Qty: 60 | Refills: 0 | Status: ACTIVE | COMMUNITY

## 2024-10-09 RX ORDER — BUDESONIDE 3 MG/1
2 CAPSULES CAPSULE ORAL ONCE A DAY
Qty: 60 | Refills: 0 | OUTPATIENT

## 2024-10-09 RX ORDER — CHOLESTYRAMINE 4 G/9G
1 PACKET MIXED WITH WATER OR NON-CARBONATED DRINK POWDER, FOR SUSPENSION ORAL TWICE A DAY
Qty: 60 | Refills: 2 | COMMUNITY
Start: 2022-08-15

## 2024-10-09 RX ORDER — VEDOLIZUMAB 300 MG/5ML
INFUSE 300MG OVER 30 MINUTES INJECTION, POWDER, LYOPHILIZED, FOR SOLUTION INTRAVENOUS
COMMUNITY

## 2024-10-09 RX ORDER — BUDESONIDE 9 MG/1
1 TABLET IN THE MORNING TABLET, EXTENDED RELEASE ORAL ONCE A DAY
Qty: 30 | Refills: 1 | COMMUNITY

## 2024-10-09 RX ORDER — MESALAMINE 1.2 G/1
4 TABLETS WITH A MEAL TABLET, DELAYED RELEASE ORAL ONCE A DAY
Qty: 120 TABLET | Refills: 2 | COMMUNITY

## 2024-10-25 ENCOUNTER — OFFICE VISIT (OUTPATIENT)
Dept: URBAN - METROPOLITAN AREA CLINIC 128 | Facility: CLINIC | Age: 39
End: 2024-10-25

## 2024-11-15 ENCOUNTER — OFFICE VISIT (OUTPATIENT)
Dept: URBAN - METROPOLITAN AREA CLINIC 128 | Facility: CLINIC | Age: 39
End: 2024-11-15

## 2024-11-15 ENCOUNTER — OFFICE VISIT (OUTPATIENT)
Dept: URBAN - METROPOLITAN AREA CLINIC 128 | Facility: CLINIC | Age: 39
End: 2024-11-15
Payer: COMMERCIAL

## 2024-11-15 VITALS
OXYGEN SATURATION: 98 % | WEIGHT: 129 LBS | HEART RATE: 84 BPM | TEMPERATURE: 97.7 F | SYSTOLIC BLOOD PRESSURE: 120 MMHG | HEIGHT: 63 IN | BODY MASS INDEX: 22.86 KG/M2 | DIASTOLIC BLOOD PRESSURE: 80 MMHG

## 2024-11-15 DIAGNOSIS — K51.30 ULCERATIVE RECTOSIGMOIDITIS WITHOUT COMPLICATION: ICD-10-CM

## 2024-11-15 PROCEDURE — 99214 OFFICE O/P EST MOD 30 MIN: CPT | Performed by: PHYSICIAN ASSISTANT

## 2024-11-15 RX ORDER — BUDESONIDE 3 MG/1
2 CAPSULES CAPSULE ORAL ONCE A DAY
Qty: 60 | Refills: 0 | Status: DISCONTINUED | COMMUNITY

## 2024-11-15 RX ORDER — BUDESONIDE 3 MG/1
2 CAPSULES CAPSULE ORAL ONCE A DAY
Qty: 60 | Refills: 0 | Status: ACTIVE | COMMUNITY

## 2024-11-15 RX ORDER — BUDESONIDE 9 MG/1
1 TABLET IN THE MORNING TABLET, EXTENDED RELEASE ORAL ONCE A DAY
Qty: 30 | Refills: 1 | Status: DISCONTINUED | COMMUNITY

## 2024-11-15 RX ORDER — POLYETHYLENE GLYCOL 3350, SODIUM SULFATE ANHYDROUS, SODIUM BICARBONATE, SODIUM CHLORIDE, POTASSIUM CHLORIDE 236; 22.74; 6.74; 5.86; 2.97 G/4L; G/4L; G/4L; G/4L; G/4L
AS DIRECTED POWDER, FOR SOLUTION ORAL ONCE A DAY
Qty: 1 BOTTLE | Refills: 0 | OUTPATIENT
Start: 2024-11-15 | End: 2024-11-16

## 2024-11-15 RX ORDER — BUDESONIDE 9 MG/1
1 TABLET IN THE MORNING TABLET, EXTENDED RELEASE ORAL ONCE A DAY
Qty: 30 | Refills: 1 | COMMUNITY

## 2024-11-15 RX ORDER — MESALAMINE 1.2 G/1
4 TABLETS WITH A MEAL TABLET, DELAYED RELEASE ORAL ONCE A DAY
Qty: 120 TABLET | Refills: 2 | COMMUNITY

## 2024-11-15 RX ORDER — MESALAMINE 1.2 G/1
4 TABLETS WITH A MEAL TABLET, DELAYED RELEASE ORAL ONCE A DAY
Qty: 120 TABLET | Refills: 2 | Status: DISCONTINUED | COMMUNITY

## 2024-11-15 RX ORDER — ETONOGESTREL AND ETHINYL ESTRADIOL .12; .015 MG/D; MG/D
1 RING LEAVE IN PLACE FOR 3 WEEKS, REMOVE, AND REPLACE WITH A NEW RING AFTER 7 DAY BREAK INSERT, EXTENDED RELEASE VAGINAL
COMMUNITY

## 2024-11-15 RX ORDER — CHOLESTYRAMINE 4 G/9G
1 PACKET MIXED WITH WATER OR NON-CARBONATED DRINK POWDER, FOR SUSPENSION ORAL TWICE A DAY
Qty: 60 | Refills: 2 | Status: DISCONTINUED | COMMUNITY
Start: 2022-08-15

## 2024-11-15 RX ORDER — ETONOGESTREL AND ETHINYL ESTRADIOL .12; .015 MG/D; MG/D
1 RING LEAVE IN PLACE FOR 3 WEEKS, REMOVE, AND REPLACE WITH A NEW RING AFTER 7 DAY BREAK INSERT, EXTENDED RELEASE VAGINAL
Status: DISCONTINUED | COMMUNITY

## 2024-11-15 RX ORDER — VEDOLIZUMAB 300 MG/5ML
INFUSE 300MG OVER 30 MINUTES INJECTION, POWDER, LYOPHILIZED, FOR SOLUTION INTRAVENOUS
Status: ACTIVE | COMMUNITY

## 2024-11-15 RX ORDER — VEDOLIZUMAB 300 MG/5ML
INFUSE 300MG OVER 30 MINUTES INJECTION, POWDER, LYOPHILIZED, FOR SOLUTION INTRAVENOUS
COMMUNITY

## 2024-11-15 RX ORDER — CHOLESTYRAMINE 4 G/9G
1 PACKET MIXED WITH WATER OR NON-CARBONATED DRINK POWDER, FOR SUSPENSION ORAL TWICE A DAY
Qty: 60 | Refills: 2 | COMMUNITY
Start: 2022-08-15

## 2024-11-15 NOTE — HPI-OTHER HISTORIES
The patient is here for a follow up visit on her ulcerative colitis.  She is now on entyvio and went from 10 BMs a day down to 1 BM a day. She denies diarrhea, rectal bleeding, mucous, abdominal pain.  Her prior 8/22 colonoscopy revealed diffuse chronic and acute colitis consistent with ulcerative colitis with no malignancy or dysplasia noted and a 1 year repeat colonoscopy was recommended. 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 Her 08/2022 calprotectin level was 1560. Her panca test was abnormal. Patient denies melena Patient denies any recent antibiotics, travel outside of the [place], sick contacts Duration of symptoms: 05/2022 She denies any blood thinner use Associated symptoms: none What alleviates symptoms: none What aggravates symptoms: none There is no family history of colon polyps or colon cancer.  The patient denies any family history of colon polyps or colon cancer or IBD Her colonoscopy in 05/2023 revealed unremarkable small intestine mucosa with benign lymphoid follicle and architectural irregularity, paneth cell metaplasia with focal active inflammation consistent with acive chronic colitis. A repeat colonoscopy in 2 years was advised so she is due in 05/2025.

## 2024-11-15 NOTE — PHYSICAL EXAM GASTROINTESTINAL
Abdomen , soft, non-tender, nondistended , no guarding or rigidity , no masses palpable , normal bowel sounds, negative Carson's sign, negative Rovsing's sign, negative psoas and obturators signs, negative CVA tenderness bilaterally Liver and Spleen , no hepatosplenomegaly Rectal deferred

## 2024-11-26 ENCOUNTER — OFFICE VISIT (OUTPATIENT)
Dept: URBAN - METROPOLITAN AREA CLINIC 18 | Facility: CLINIC | Age: 39
End: 2024-11-26
Payer: COMMERCIAL

## 2024-11-26 ENCOUNTER — TELEPHONE ENCOUNTER (OUTPATIENT)
Dept: URBAN - METROPOLITAN AREA CLINIC 18 | Facility: CLINIC | Age: 39
End: 2024-11-26

## 2024-11-26 VITALS
TEMPERATURE: 98.3 F | WEIGHT: 127 LBS | RESPIRATION RATE: 19 BRPM | HEART RATE: 88 BPM | BODY MASS INDEX: 22.5 KG/M2 | SYSTOLIC BLOOD PRESSURE: 99 MMHG | DIASTOLIC BLOOD PRESSURE: 67 MMHG | HEIGHT: 63 IN

## 2024-11-26 DIAGNOSIS — K51.90 ACUTE ULCERATIVE COLITIS: ICD-10-CM

## 2024-11-26 PROCEDURE — 96413 CHEMO IV INFUSION 1 HR: CPT | Performed by: INTERNAL MEDICINE

## 2024-11-26 RX ORDER — VEDOLIZUMAB 300 MG/5ML
INFUSE 300MG OVER 30 MINUTES INJECTION, POWDER, LYOPHILIZED, FOR SOLUTION INTRAVENOUS
Status: ACTIVE | COMMUNITY

## 2024-11-26 RX ORDER — VEDOLIZUMAB 300 MG/5ML
INFUSE 300MG OVER 30 MINUTES INJECTION, POWDER, LYOPHILIZED, FOR SOLUTION INTRAVENOUS
Qty: 3 | Refills: 1

## 2025-01-17 ENCOUNTER — TELEPHONE ENCOUNTER (OUTPATIENT)
Dept: URBAN - METROPOLITAN AREA CLINIC 18 | Facility: CLINIC | Age: 40
End: 2025-01-17

## 2025-01-17 PROBLEM — 64766004: Status: ACTIVE | Noted: 2025-01-17

## 2025-01-23 ENCOUNTER — OFFICE VISIT (OUTPATIENT)
Dept: URBAN - METROPOLITAN AREA CLINIC 18 | Facility: CLINIC | Age: 40
End: 2025-01-23
Payer: COMMERCIAL

## 2025-01-23 VITALS
SYSTOLIC BLOOD PRESSURE: 102 MMHG | HEART RATE: 78 BPM | TEMPERATURE: 97.1 F | BODY MASS INDEX: 22.86 KG/M2 | DIASTOLIC BLOOD PRESSURE: 70 MMHG | RESPIRATION RATE: 20 BRPM | HEIGHT: 63 IN | WEIGHT: 129 LBS

## 2025-01-23 DIAGNOSIS — K51.90 CHRONIC ULCERATIVE COLITIS: ICD-10-CM

## 2025-01-23 PROCEDURE — 96413 CHEMO IV INFUSION 1 HR: CPT | Performed by: STUDENT IN AN ORGANIZED HEALTH CARE EDUCATION/TRAINING PROGRAM

## 2025-01-23 RX ORDER — VEDOLIZUMAB 300 MG/5ML
INFUSE 300MG OVER 30 MINUTES INJECTION, POWDER, LYOPHILIZED, FOR SOLUTION INTRAVENOUS
Qty: 3 | Refills: 1 | Status: ACTIVE | COMMUNITY

## 2025-01-27 LAB
MITOGEN-NIL: 5.51
QUANTIFERON NIL VALUE: 0.01
QUANTIFERON TB1 AG VALUE: 0
QUANTIFERON TB2 AG VALUE: 0
QUANTIFERON-TB GOLD PLUS: NEGATIVE

## 2025-03-20 ENCOUNTER — OFFICE VISIT (OUTPATIENT)
Dept: URBAN - METROPOLITAN AREA CLINIC 18 | Facility: CLINIC | Age: 40
End: 2025-03-20
Payer: COMMERCIAL

## 2025-03-20 VITALS
TEMPERATURE: 98 F | HEART RATE: 72 BPM | HEIGHT: 63 IN | SYSTOLIC BLOOD PRESSURE: 111 MMHG | RESPIRATION RATE: 19 BRPM | BODY MASS INDEX: 23.21 KG/M2 | DIASTOLIC BLOOD PRESSURE: 70 MMHG | WEIGHT: 131 LBS

## 2025-03-20 DIAGNOSIS — K51.90 CHRONIC ULCERATIVE COLITIS: ICD-10-CM

## 2025-03-20 PROCEDURE — 96413 CHEMO IV INFUSION 1 HR: CPT | Performed by: INTERNAL MEDICINE

## 2025-03-20 RX ORDER — VEDOLIZUMAB 300 MG/5ML
INFUSE 300MG OVER 30 MINUTES INJECTION, POWDER, LYOPHILIZED, FOR SOLUTION INTRAVENOUS
Qty: 3 | Refills: 1 | Status: ACTIVE | COMMUNITY

## 2025-05-15 ENCOUNTER — OFFICE VISIT (OUTPATIENT)
Dept: URBAN - METROPOLITAN AREA CLINIC 18 | Facility: CLINIC | Age: 40
End: 2025-05-15
Payer: COMMERCIAL

## 2025-05-15 DIAGNOSIS — K51.90 CHRONIC ULCERATIVE COLITIS: ICD-10-CM

## 2025-05-15 PROCEDURE — 96413 CHEMO IV INFUSION 1 HR: CPT | Performed by: INTERNAL MEDICINE

## 2025-05-15 RX ORDER — VEDOLIZUMAB 300 MG/5ML
INFUSE 300MG OVER 30 MINUTES INJECTION, POWDER, LYOPHILIZED, FOR SOLUTION INTRAVENOUS
Qty: 3 | Refills: 1 | Status: ACTIVE | COMMUNITY

## 2025-05-23 ENCOUNTER — OFFICE VISIT (OUTPATIENT)
Dept: URBAN - METROPOLITAN AREA SURGERY CENTER 31 | Facility: SURGERY CENTER | Age: 40
End: 2025-05-23

## 2025-06-20 ENCOUNTER — OFFICE VISIT (OUTPATIENT)
Dept: URBAN - METROPOLITAN AREA CLINIC 128 | Facility: CLINIC | Age: 40
End: 2025-06-20

## 2025-06-20 RX ORDER — VEDOLIZUMAB 300 MG/5ML
INFUSE 300MG OVER 30 MINUTES INJECTION, POWDER, LYOPHILIZED, FOR SOLUTION INTRAVENOUS
Qty: 3 | Refills: 1 | Status: ACTIVE | COMMUNITY

## 2025-06-26 ENCOUNTER — TELEPHONE ENCOUNTER (OUTPATIENT)
Dept: URBAN - METROPOLITAN AREA CLINIC 128 | Facility: CLINIC | Age: 40
End: 2025-06-26

## 2025-06-27 ENCOUNTER — OFFICE VISIT (OUTPATIENT)
Dept: URBAN - METROPOLITAN AREA SURGERY CENTER 31 | Facility: SURGERY CENTER | Age: 40
End: 2025-06-27

## 2025-07-03 ENCOUNTER — OFFICE VISIT (OUTPATIENT)
Dept: URBAN - METROPOLITAN AREA CLINIC 128 | Facility: CLINIC | Age: 40
End: 2025-07-03
Payer: COMMERCIAL

## 2025-07-03 DIAGNOSIS — K51.80 OTHER ULCERATIVE COLITIS WITHOUT COMPLICATION: ICD-10-CM

## 2025-07-03 PROCEDURE — 99214 OFFICE O/P EST MOD 30 MIN: CPT | Performed by: PHYSICIAN ASSISTANT

## 2025-07-03 RX ORDER — VEDOLIZUMAB 300 MG/5ML
INFUSE 300MG OVER 30 MINUTES INJECTION, POWDER, LYOPHILIZED, FOR SOLUTION INTRAVENOUS
Qty: 3 | Refills: 1 | Status: ACTIVE | COMMUNITY

## 2025-07-03 NOTE — HPI-OTHER HISTORIES
The patient is here for a follow up visit on her ulcerative colitis. She just found out she is pregnant. She is in her first trimester. LMP: 6/30/25, she has an appt with OB NP Ms. Hernandez with [facility]. She is now on entyvio and went from 10 BMs a day down to 1 BM a day. She denies diarrhea, rectal bleeding, mucous, abdominal pain.  Her prior 8/22 colonoscopy revealed diffuse chronic and acute colitis consistent with ulcerative colitis with no malignancy or dysplasia noted and a 1 year repeat colonoscopy was recommended. 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 Her 08/2022 calprotectin level was 1560. Her panca test was abnormal. Patient denies melena Patient denies any recent antibiotics, travel outside of the [place], sick contacts Duration of symptoms: 05/2022 She denies any blood thinner use Associated symptoms: none What alleviates symptoms: none What aggravates symptoms: none There is no family history of colon polyps or colon cancer.  The patient denies any family history of colon polyps or colon cancer or IBD Her colonoscopy in 05/2023 revealed unremarkable small intestine mucosa with benign lymphoid follicle and architectural irregularity, paneth cell metaplasia with focal active inflammation consistent with acive chronic colitis. A repeat colonoscopy in 2 years was advised so she is due to repeat it but will need to have it rescheduled for after she delivers her baby.

## 2025-07-10 ENCOUNTER — TELEPHONE ENCOUNTER (OUTPATIENT)
Dept: URBAN - METROPOLITAN AREA CLINIC 18 | Facility: CLINIC | Age: 40
End: 2025-07-10

## 2025-07-10 ENCOUNTER — OFFICE VISIT (OUTPATIENT)
Dept: URBAN - METROPOLITAN AREA CLINIC 18 | Facility: CLINIC | Age: 40
End: 2025-07-10
Payer: COMMERCIAL

## 2025-07-10 DIAGNOSIS — K51.90 ACUTE ULCERATIVE COLITIS: ICD-10-CM

## 2025-07-10 PROCEDURE — 96413 CHEMO IV INFUSION 1 HR: CPT | Performed by: INTERNAL MEDICINE

## 2025-07-10 RX ORDER — VEDOLIZUMAB 300 MG/5ML
INFUSE 300MG OVER 30 MINUTES INJECTION, POWDER, LYOPHILIZED, FOR SOLUTION INTRAVENOUS
Qty: 3 | Refills: 1 | Status: ACTIVE | COMMUNITY

## 2025-07-18 ENCOUNTER — OFFICE VISIT (OUTPATIENT)
Dept: URBAN - METROPOLITAN AREA CLINIC 128 | Facility: CLINIC | Age: 40
End: 2025-07-18